# Patient Record
(demographics unavailable — no encounter records)

---

## 2025-06-18 NOTE — HISTORY OF PRESENT ILLNESS
[de-identified] : 6/18/25:   Follow up right shoulder.  Improving.  Only 1 PT session so far.   05/07/2025: Myron Perez, a 65 year old RHD male, presents today for right shoulder pain for a few months.  No specific or trauma.  Pain worse with lifting and reaching.   Pain mostly anterior shoulder.   No shooting pains.  No numbness or tingling.   Rest and nsaids prn.   Pmhx - denies.

## 2025-06-18 NOTE — ASSESSMENT
[FreeTextEntry1] : R shoulder with proximal biceps tendinitis/ cuff tendinitis.   Improving.   Continue with PT. HEP. Diclofenac 75mg BID prn pain.   May consider possible CSI versus MRI.  Follow up in 6 weeks.

## 2025-06-18 NOTE — PHYSICAL EXAM
[Right] : right shoulder [5 ___] : forward flexion 5[unfilled]/5 [5___] : external rotation 5[unfilled]/5 [] : no erythema [FreeTextEntry9] :  ER 60

## 2025-07-30 NOTE — PHYSICAL EXAM
[Right] : right shoulder [5 ___] : forward flexion 5[unfilled]/5 [5___] : external rotation 5[unfilled]/5 [] : no erythema [FreeTextEntry9] :  ER 50

## 2025-07-30 NOTE — HISTORY OF PRESENT ILLNESS
[de-identified] : 07/30/25: Here for follow up.  He reports improvement with PT, about 80%.    6/18/25:   Follow up right shoulder.  Improving.  Only 1 PT session so far.   05/07/2025: Myron Perez, a 65 year old RHD male, presents today for right shoulder pain for a few months.  No specific or trauma.  Pain worse with lifting and reaching.   Pain mostly anterior shoulder.   No shooting pains.  No numbness or tingling.   Rest and nsaids prn.   Pmhx - denies.

## 2025-07-30 NOTE — ASSESSMENT
[FreeTextEntry1] : R shoulder with proximal biceps tendinitis/ cuff tendinitis.   Improving.   He did PT with improvement.   HEP. Diclofenac 75mg BID prn pain.   RTO prn.